# Patient Record
Sex: FEMALE | Race: WHITE
[De-identification: names, ages, dates, MRNs, and addresses within clinical notes are randomized per-mention and may not be internally consistent; named-entity substitution may affect disease eponyms.]

---

## 2019-02-21 ENCOUNTER — HOSPITAL ENCOUNTER (INPATIENT)
Dept: HOSPITAL 97 - ER | Age: 84
LOS: 3 days | Discharge: HOME | DRG: 392 | End: 2019-02-24
Attending: FAMILY MEDICINE | Admitting: FAMILY MEDICINE
Payer: COMMERCIAL

## 2019-02-21 VITALS — BODY MASS INDEX: 21.8 KG/M2

## 2019-02-21 DIAGNOSIS — K52.9: Primary | ICD-10-CM

## 2019-02-21 DIAGNOSIS — E05.90: ICD-10-CM

## 2019-02-21 DIAGNOSIS — E53.8: ICD-10-CM

## 2019-02-21 DIAGNOSIS — D50.9: ICD-10-CM

## 2019-02-21 DIAGNOSIS — Z80.0: ICD-10-CM

## 2019-02-21 DIAGNOSIS — N17.9: ICD-10-CM

## 2019-02-21 DIAGNOSIS — I10: ICD-10-CM

## 2019-02-21 DIAGNOSIS — N39.0: ICD-10-CM

## 2019-02-21 DIAGNOSIS — B96.20: ICD-10-CM

## 2019-02-21 DIAGNOSIS — E86.0: ICD-10-CM

## 2019-02-21 LAB
ALBUMIN SERPL BCP-MCNC: 3 G/DL (ref 3.4–5)
ALP SERPL-CCNC: 54 U/L (ref 45–117)
ALT SERPL W P-5'-P-CCNC: < 6 U/L (ref 12–78)
AST SERPL W P-5'-P-CCNC: 10 U/L (ref 15–37)
BUN BLD-MCNC: 54 MG/DL (ref 7–18)
FERRITIN SERPL-MCNC: 21.8 NG/ML (ref 8–388)
GLUCOSE SERPLBLD-MCNC: 139 MG/DL (ref 74–106)
HCT VFR BLD CALC: 35.1 % (ref 36–45)
IRON SERPL-MCNC: 24 UG/DL (ref 50–170)
LIPASE SERPL-CCNC: 177 U/L (ref 73–393)
LYMPHOCYTES # SPEC AUTO: 1.2 K/UL (ref 0.7–4.9)
PMV BLD: 9.3 FL (ref 7.6–11.3)
POTASSIUM SERPL-SCNC: 3.9 MMOL/L (ref 3.5–5.1)
RBC # BLD: 4.46 M/UL (ref 3.86–4.86)
TRANSFERRIN SERPL-MCNC: 263 MG/DL (ref 200–360)
TSH SERPL DL<=0.05 MIU/L-ACNC: 0.08 UIU/ML (ref 0.36–3.74)
UA COMPLETE W REFLEX CULTURE PNL UR: (no result)

## 2019-02-21 PROCEDURE — 87040 BLOOD CULTURE FOR BACTERIA: CPT

## 2019-02-21 PROCEDURE — 83540 ASSAY OF IRON: CPT

## 2019-02-21 PROCEDURE — 82728 ASSAY OF FERRITIN: CPT

## 2019-02-21 PROCEDURE — 74177 CT ABD & PELVIS W/CONTRAST: CPT

## 2019-02-21 PROCEDURE — 84466 ASSAY OF TRANSFERRIN: CPT

## 2019-02-21 PROCEDURE — 99285 EMERGENCY DEPT VISIT HI MDM: CPT

## 2019-02-21 PROCEDURE — 87088 URINE BACTERIA CULTURE: CPT

## 2019-02-21 PROCEDURE — 80048 BASIC METABOLIC PNL TOTAL CA: CPT

## 2019-02-21 PROCEDURE — 81003 URINALYSIS AUTO W/O SCOPE: CPT

## 2019-02-21 PROCEDURE — 87045 FECES CULTURE AEROBIC BACT: CPT

## 2019-02-21 PROCEDURE — 36415 COLL VENOUS BLD VENIPUNCTURE: CPT

## 2019-02-21 PROCEDURE — 84443 ASSAY THYROID STIM HORMONE: CPT

## 2019-02-21 PROCEDURE — 84145 PROCALCITONIN (PCT): CPT

## 2019-02-21 PROCEDURE — 85025 COMPLETE CBC W/AUTO DIFF WBC: CPT

## 2019-02-21 PROCEDURE — 84439 ASSAY OF FREE THYROXINE: CPT

## 2019-02-21 PROCEDURE — 80076 HEPATIC FUNCTION PANEL: CPT

## 2019-02-21 PROCEDURE — 87046 STOOL CULTR AEROBIC BACT EA: CPT

## 2019-02-21 PROCEDURE — 97163 PT EVAL HIGH COMPLEX 45 MIN: CPT

## 2019-02-21 PROCEDURE — 82607 VITAMIN B-12: CPT

## 2019-02-21 PROCEDURE — 97116 GAIT TRAINING THERAPY: CPT

## 2019-02-21 PROCEDURE — 87086 URINE CULTURE/COLONY COUNT: CPT

## 2019-02-21 PROCEDURE — 87493 C DIFF AMPLIFIED PROBE: CPT

## 2019-02-21 PROCEDURE — 81015 MICROSCOPIC EXAM OF URINE: CPT

## 2019-02-21 PROCEDURE — 83735 ASSAY OF MAGNESIUM: CPT

## 2019-02-21 PROCEDURE — 87186 SC STD MICRODIL/AGAR DIL: CPT

## 2019-02-21 PROCEDURE — 87077 CULTURE AEROBIC IDENTIFY: CPT

## 2019-02-21 PROCEDURE — 83690 ASSAY OF LIPASE: CPT

## 2019-02-21 RX ADMIN — ENOXAPARIN SODIUM SCH MG: 30 INJECTION SUBCUTANEOUS at 18:46

## 2019-02-21 RX ADMIN — SODIUM CHLORIDE SCH MLS: 0.9 INJECTION, SOLUTION INTRAVENOUS at 18:25

## 2019-02-21 RX ADMIN — Medication SCH: at 21:00

## 2019-02-21 NOTE — EDPHYS
Physician Documentation                                                                           

 [facility]                                                                

Name: Valarie Young                                                                            

Age: 89 yrs                                                                                       

Sex: Female                                                                                       

: 1929                                                                                   

MRN: [id number]                                                                                   

Arrival Date: 2019                                                                          

Time: 10:33                                                                                       

Account#: [id number]                                                                            

Bed 2                                                                                             

Private MD: Gilmar Frausto ED Physician Margarito Oro                                                                      

HPI:                                                                                              

                                                                                             

14:09 This 89 yrs old  Female presents to ER via Ambulatory with complaints of       pm1 

      Abdominal Pain, Constipation, Vomiting.                                                     

14:09 The patient presents with abdominal pain that is diffuse. Onset: The symptoms/episode   pm1 

      began/occurred 5 day(s) ago. The symptoms do not radiate. Associated signs and              

      symptoms: Pertinent positives: constipation, vomiting, Pertinent negatives: chest pain,     

      dysuria, fever, shortness of breath. The symptoms are described as achy. Modifying          

      factors: The symptoms are alleviated by nothing, the symptoms are aggravated by food.       

      Severity of pain: in the emergency department the pain is actually worse. The patient       

      has experienced similar episodes in the past, several times. The patient has not            

      recently seen a physician, the patient's primary care provider is Dr. Frausto. Patient     

      with constipation for 1 week and then started having some abdominal distention and pain     

      with vomiting when trying to drink for the past 4 days. Patient took some Colace and        

      mag citrate to resolve constipation. Had some diarrhea x 2 this AM.                         

                                                                                                  

Historical:                                                                                       

- Allergies:                                                                                      

10:49 No Known Allergies;                                                                     bp  

- Home Meds:                                                                                      

10:49 Atenolol Oral [Active]; Hydrochlorothiazide Oral [Active];                              bp  

- PMHx:                                                                                           

10:49 Hypertension;                                                                           bp  

                                                                                                  

- Immunization history:: Adult Immunizations up to date.                                          

- Social history:: Smoking status: Patient/guardian denies using tobacco.                         

- Ebola Screening: : Patient negative for fever greater than or equal to 101.5 degrees            

  Fahrenheit, and additional compatible Ebola Virus Disease symptoms Patient denies               

  exposure to infectious person Patient denies travel to an Ebola-affected area in the            

  21 days before illness onset No symptoms or risks identified at this time.                      

                                                                                                  

                                                                                                  

ROS:                                                                                              

14:09 Constitutional: Negative for fever, chills, and weight loss, Eyes: Negative for injury, pm1 

      pain, redness, and discharge, ENT: Negative for injury, pain, and discharge, Neck:          

      Negative for injury, pain, and swelling, Cardiovascular: Negative for chest pain,           

      palpitations, and edema, Respiratory: Negative for shortness of breath, cough,              

      wheezing, and pleuritic chest pain.                                                         

14:09 Back: Negative for injury and pain, : Negative for injury, bleeding, discharge, and       

      swelling, MS/Extremity: Negative for injury and deformity, Skin: Negative for injury,       

      rash, and discoloration, Neuro: Negative for headache, weakness, numbness, tingling,        

      and seizure.                                                                                

14:09 Abdomen/GI: Positive for abdominal pain, nausea and vomiting, constipation, diarrhea x      

      2 today.                                                                                    

                                                                                                  

Exam:                                                                                             

14:09 Constitutional:  This is a well developed, well nourished patient who is awake, alert,  pm1 

      and in no acute distress. Head/Face:  Normocephalic, atraumatic. Eyes:  Pupils equal        

      round and reactive to light, extra-ocular motions intact.  Lids and lashes normal.          

      Conjunctiva and sclera are non-icteric and not injected.  Cornea within normal limits.      

      Periorbital areas with no swelling, redness, or edema. ENT:  Nares patent. No nasal         

      discharge, no septal abnormalities noted.  Tympanic membranes are normal and external       

      auditory canals are clear.  Oropharynx with no redness, swelling, or masses, exudates,      

      or evidence of obstruction, uvula midline.  Mucous membranes moist. Neck:  Trachea          

      midline, no thyromegaly or masses palpated, and no cervical lymphadenopathy.  Supple,       

      full range of motion without nuchal rigidity, or vertebral point tenderness.  No            

      Meningismus. Chest/axilla:  Normal chest wall appearance and motion.  Nontender with no     

      deformity.  No lesions are appreciated. Cardiovascular:  Regular rate and rhythm with a     

      normal S1 and S2.  No gallops, murmurs, or rubs.  Normal PMI, no JVD.  No pulse             

      deficits. Respiratory:  Lungs have equal breath sounds bilaterally, clear to                

      auscultation and percussion.  No rales, rhonchi or wheezes noted.  No increased work of     

      breathing, no retractions or nasal flaring. Abdomen/GI:  Soft, non-tender, with normal      

      bowel sounds.  No distension or tympany.  No guarding or rebound.  No evidence of           

      tenderness throughout. Back:  No spinal tenderness.  No costovertebral tenderness.          

      Full range of motion. Skin:  Warm, dry with normal turgor.  Normal color with no            

      rashes, no lesions, and no evidence of cellulitis. MS/ Extremity:  Pulses equal, no         

      cyanosis.  Neurovascular intact.  Full, normal range of motion.                             

14:09 Neuro: Orientation: is normal, Motor: is normal, moves all fours.                           

                                                                                                  

Vital Signs:                                                                                      

10:49  / 53; Pulse 74; Resp 16; Temp 98.6; Pulse Ox 96% ; Weight 57.61 kg; Height 5 ft. bp  

      7 in. (170.18 cm);                                                                          

11:36  / 46; Pulse 67; Resp 14; Pulse Ox 100% ;                                         bp  

12:19  / 51; Pulse 66; Resp 12; Pulse Ox 94% ;                                          bp  

14:00  / 46; Pulse 67; Resp 14; Pulse Ox 97% ;                                          bp  

15:00  / 45; Pulse 68; Resp 16; Pulse Ox 95% ;                                          bp  

16:00  / 53; Pulse 70; Resp 14; Pulse Ox 97% ;                                          bp  

17:00  / 51; Pulse 66; Resp 16; Pulse Ox 96% ;                                          bp  

10:49 Body Mass Index 19.89 (57.61 kg, 170.18 cm)                                             bp  

                                                                                                  

MDM:                                                                                              

10:53 Patient medically screened.                                                             pm1 

14:04 Data reviewed: vital signs.                                                             pm1 

14:13 Counseling: I had a detailed discussion with the patient and/or guardian regarding: the pm1 

      historical points, exam findings, and any diagnostic results supporting the                 

      discharge/admit diagnosis, lab results, radiology results, the need for further work-up     

      and treatment in the hospital.                                                              

15:31 Physician consultation: Rico Espinosa DO was called at 15:31, was contacted at 15:31,   pm1 

      regarding admission, patient's condition, would like further tests performed,               

      Procalcitonin, in the emergency department to see patient at 15:31.                         

                                                                                                  

                                                                                             

11:01 Order name: Basic Metabolic Panel; Complete Time: 11:58                                 pm1 

                                                                                             

11:01 Order name: CBC with Diff; Complete Time: 11:55                                         pm1 

                                                                                             

11:01 Order name: Creatinine for Radiology; Complete Time: 11:55                              pm1 

                                                                                             

11:01 Order name: Hepatic Function; Complete Time: 11:58                                      pm1 

                                                                                             

11:01 Order name: Lipase; Complete Time: 11:58                                                pm1 

                                                                                             

14:46 Order name: Urine Microscopic Only; Complete Time: 16:06                                pm1 

                                                                                             

11:01 Order name: CT Abd/Pelvis - W/Contrast: PO and IV contrast; Complete Time: 13:12        pm1 

                                                                                             

14:46 Order name: Urine Culture                                                               pm                                                                                             

14:53 Order name: Urine Dipstick--Ancillary (enter results); Complete Time: 16:06             eb  

                                                                                             

15:28 Order name: Procalcitonin                                                               pm                                                                                             

11:01 Order name: IV Saline Lock; Complete Time: 11:31                                        pm1 

                                                                                             

11:01 Order name: Labs collected and sent; Complete Time: 11:31                               pm                                                                                             

12:00 Order name: Urine Dipstick-Ancillary (obtain specimen); Complete Time: 14:59            pm1 

                                                                                                  

Administered Medications:                                                                         

11:45 Drug: Zofran 4 mg Route: IVP; Site: right forearm;                                      bp  

12:18 Follow up: Response: Nausea is decreased                                                bp  

12:18 Drug: NS 0.9% 500 ml Route: IV; Rate: bolus; Site: right forearm;                       bp  

13:00 Follow up: IV Status: Completed infusion; IV Intake: 500ml                              bp  

14:20 Drug: NS 0.9% 1000 ml Route: IV; Rate: 100 ml/hr; Site: right antecubital;              bp  

14:20 Drug: Flagyl 500 mg Volume: 100 ml; Route: IVPB; Rate: 200 ml/hr; Infused Over: 30      bp  

      mins; Site: right antecubital;                                                              

16:12 Follow up: IV Status: Completed infusion; IV Intake: 100ml                              bp  

15:45 Drug: LevaQUIN 500 mg Volume: 100 ml; Route: IVPB; Infused Over: 60 mins; Site: right   bp  

      antecubital;                                                                                

                                                                                                  

                                                                                                  

Disposition:                                                                                      

19 14:14 Hospitalization ordered by Rico Espinosa for Inpatient Admission. Preliminary     

  diagnosis are Dehydration, Constipation, Unspecified abdominal pain, Right                      

  sided colitis, Vomiting.                                                                        

- Bed requested for Telemetry/MedSurg (Inpatient).                                                

- Status is Inpatient Admission.                                                              bp  

- Condition is Stable.                                                                            

- Problem is new.                                                                                 

- Symptoms have improved.                                                                         

UTI on Admission? No                                                                              

                                                                                                  

                                                                                                  

                                                                                                  

Signatures:                                                                                       

Dispatcher [employer]                           EDMS                                                 

Juanito Lainez, ANNELISE                    NP   pm1                                                  

Danelle Aquino, TAMMI                   RN   df                                                   

Mich Burk RN                      RN   bp                                                   

                                                                                                  

Corrections: (The following items were deleted from the chart)                                    

14:17 14:14 Hospitalization Ordered by Rico Espinosa DO for Inpatient Admission. Preliminary  pm1 

      diagnosis is Dehydration; Constipation; Unspecified abdominal pain. Bed requested for       

      Telemetry/MedSurg (Inpatient). Status is Inpatient Admission. Condition is Stable.          

      Problem is new. Symptoms have improved. UTI on Admission? No. pm1                           

17:09 14:17 2019 14:14 Hospitalization Ordered by Rico Espinosa DO for Inpatient        df  

      Admission. Preliminary diagnosis is Dehydration; Constipation; Unspecified abdominal        

      pain; Right sided colitis; Vomiting. Bed requested for Telemetry/MedSurg (Inpatient).       

      Status is Inpatient Admission. Condition is Stable. Problem is new. Symptoms have           

      improved. UTI on Admission? No. pm1                                                         

18:11 17:09 2019 14:14 Hospitalization Ordered by Rico Espinosa DO for Inpatient        bp  

      Admission. Preliminary diagnosis is Dehydration; Constipation; Unspecified abdominal        

      pain; Right sided colitis; Vomiting. Bed requested for Telemetry/MedSurg (Inpatient).       

      Status is Inpatient Admission. Condition is Stable. Problem is new. Symptoms have           

      improved. UTI on Admission? No. df                                                          

                                                                                                  

**************************************************************************************************

## 2019-02-21 NOTE — ER
Nurse's Notes                                                                                     

 [facility]                                                                

Name: Valarie Young                                                                            

Age: 89 yrs                                                                                       

Sex: Female                                                                                       

: 1929                                                                                   

MRN: [id number]                                                                                   

Arrival Date: 2019                                                                          

Time: 10:33                                                                                       

Account#: [id number]                                                                            

Bed 2                                                                                             

Private MD: Gilmar Frausto                                                                     

Diagnosis: Dehydration;Constipation;Unspecified abdominal pain;Right sided colitis;Vomiting       

                                                                                                  

Presentation:                                                                                     

                                                                                             

10:47 Presenting complaint: Patient states: NAUSEA AND VOMITING WITH DIFFUSE ABD PAIN AFTER   bp  

      TAKING COLACE AND MAG CITRATE TO RESOLVE CONSTIPATION. Transition of care: patient was      

      not received from another setting of care. Onset of symptoms is unknown. Risk               

      Assessment: Do you want to hurt yourself or someone else? Patient reports no desire to      

      harm self or others. Initial Sepsis Screen: Does the patient meet any 2 criteria? No.       

      Patient's initial sepsis screen is negative. Does the patient have a suspected source       

      of infection? No. Patient's initial sepsis screen is negative. Care prior to arrival:       

      None.                                                                                       

10:47 Method Of Arrival: Ambulatory                                                           bp  

10:47 Acuity: JEZ 3                                                                           bp  

                                                                                                  

Triage Assessment:                                                                                

10:49 General: Appears in no apparent distress. comfortable, Behavior is calm, cooperative,   bp  

      appropriate for age. Pain: Complains of pain in DIFFUSE ABDOMEN. EENT: No deficits          

      noted. Neuro: Level of Consciousness is awake, alert, obeys commands, Oriented to           

      person, place, time, situation, Appropriate for age. Cardiovascular: No deficits noted.     

      Respiratory: Airway is patent Respiratory effort is even, unlabored, Respiratory            

      pattern is regular, symmetrical. GI: Abdomen is non-distended, Abd is soft X 4 quads        

      Abdomen is tender to palpation X 4 quads. : No signs and/or symptoms were reported        

      regarding the genitourinary system. Derm: No deficits noted. Musculoskeletal:               

      Circulation, motion, and sensation intact. Range of motion: intact in all extremities.      

                                                                                                  

Historical:                                                                                       

- Allergies:                                                                                      

10:49 No Known Allergies;                                                                     bp  

- Home Meds:                                                                                      

10:49 Atenolol Oral [Active]; Hydrochlorothiazide Oral [Active];                              bp  

- PMHx:                                                                                           

10:49 Hypertension;                                                                           bp  

                                                                                                  

- Immunization history:: Adult Immunizations up to date.                                          

- Social history:: Smoking status: Patient/guardian denies using tobacco.                         

- Ebola Screening: : Patient negative for fever greater than or equal to 101.5 degrees            

  Fahrenheit, and additional compatible Ebola Virus Disease symptoms Patient denies               

  exposure to infectious person Patient denies travel to an Ebola-affected area in the            

  21 days before illness onset No symptoms or risks identified at this time.                      

                                                                                                  

                                                                                                  

Screening:                                                                                        

10:52 Abuse screen: Denies threats or abuse. Denies injuries from another. Nutritional        bp  

      screening: No deficits noted. Tuberculosis screening: No symptoms or risk factors           

      identified. Fall Risk None identified.                                                      

                                                                                                  

Assessment:                                                                                       

10:52 General: SEE TRIAGE NOTE.                                                               bp  

11:36 Reassessment: PO CONTRAST COMPLETED, CT NOTIFIED.                                       bp  

12:19 Reassessment: CT PENDING, VS STABLE ON MONITOR.                                         bp  

14:00 GI: Bowel sounds present X 4 quads.                                                     bp  

14:17 Reassessment: PER RAD, COLITIS VS ILEUS NOTED ON CT. ABX INFUSING.                      bp  

16:00 Reassessment: ADMIT IN PROCESS, ADMIT MD AT B/S.                                        bp  

17:15 Reassessment: ADMIT ON HOLD DUE TO PT ACUITY ON RECEIVING FLOOR.                        bp  

17:53 Reassessment: REPORT TO RILEY CADENA.                                                       bp  

                                                                                                  

Vital Signs:                                                                                      

10:49  / 53; Pulse 74; Resp 16; Temp 98.6; Pulse Ox 96% ; Weight 57.61 kg; Height 5 ft. bp  

      7 in. (170.18 cm);                                                                          

11:36  / 46; Pulse 67; Resp 14; Pulse Ox 100% ;                                         bp  

12:19  / 51; Pulse 66; Resp 12; Pulse Ox 94% ;                                          bp  

14:00  / 46; Pulse 67; Resp 14; Pulse Ox 97% ;                                          bp  

15:00  / 45; Pulse 68; Resp 16; Pulse Ox 95% ;                                          bp  

16:00  / 53; Pulse 70; Resp 14; Pulse Ox 97% ;                                          bp  

17:00  / 51; Pulse 66; Resp 16; Pulse Ox 96% ;                                          bp  

10:49 Body Mass Index 19.89 (57.61 kg, 170.18 cm)                                             bp  

                                                                                                  

ED Course:                                                                                        

10:33 Patient arrived in ED.                                                                  rg4 

10:33 Gilmar Frausto DO is Private Physician.                                             rg4 

10:43 [facility]                                                           pm1 

10:43 Margarito Oro MD is Attending Physician.                                             pm1 

10:47 Mich Burk, RN is Primary Nurse.                                                    bp  

10:48 Triage completed.                                                                       bp  

10:49 Arm band placed on.                                                                     bp  

10:52 Patient has correct armband on for positive identification. Placed in gown. Bed in low  bp  

      position. Call light in reach. Side rails up X2. Adult w/ patient.                          

11:20 Inserted saline lock: 20 gauge in right forearm, using aseptic technique. Blood         bp  

      collected.                                                                                  

12:53 CT completed. Patient tolerated procedure well. Patient moved to CT via stretcher.        

      Patient moved back from CT.                                                                 

12:57 CT Abd/Pelvis - W/Contrast: PO and IV contrast In Process Unspecified.                  EDMS

14:13 Rico Espinosa DO is Hospitalizing Provider.                                           pm1 

14:50 Urine collected: clean catch specimen, lucian colored.                                   dh3 

17:12 No provider procedures requiring assistance completed. Patient admitted, IV remains in  bp  

      place.                                                                                      

                                                                                                  

Administered Medications:                                                                         

11:45 Drug: Zofran 4 mg Route: IVP; Site: right forearm;                                      bp  

12:18 Follow up: Response: Nausea is decreased                                                bp  

12:18 Drug: NS 0.9% 500 ml Route: IV; Rate: bolus; Site: right forearm;                       bp  

13:00 Follow up: IV Status: Completed infusion; IV Intake: 500ml                              bp  

14:20 Drug: NS 0.9% 1000 ml Route: IV; Rate: 100 ml/hr; Site: right antecubital;              bp  

14:20 Drug: Flagyl 500 mg Volume: 100 ml; Route: IVPB; Rate: 200 ml/hr; Infused Over: 30      bp  

      mins; Site: right antecubital;                                                              

16:12 Follow up: IV Status: Completed infusion; IV Intake: 100ml                              bp  

15:45 Drug: LevaQUIN 500 mg Volume: 100 ml; Route: IVPB; Infused Over: 60 mins; Site: right   bp  

      antecubital;                                                                                

                                                                                                  

                                                                                                  

Intake:                                                                                           

13:00 IV: 500ml; Total: 500ml.                                                                bp  

16:12 IV: 100ml; Total: 600ml.                                                                bp  

                                                                                                  

Outcome:                                                                                          

14:14 Decision to Hospitalize by Provider.                                                    pm1 

17:54 Admitted to Med/surg accompanied by tech, family with patient, via wheelchair, room     bp  

      410, with chart, Report called to  RILEY CADENA                                                 

17:54 Condition: stable                                                                           

17:54 Instructed on the need for admit.                                                           

18:11 Patient left the ED.                                                                    bp  

                                                                                                  

Signatures:                                                                                       

Dispatcher [employer]                           EDMS                                                 

Dunia Diaz Patrick, NP                    NP   pm1                                                  

Alyssia Hernandes                                 rg4                                                  

Alicia Bernstein                              dh3                                                  

Mich Burk, RN                      RN   bp                                                   

                                                                                                  

**************************************************************************************************

## 2019-02-21 NOTE — RAD REPORT
EXAM DESCRIPTION:  CT - Abdomen   Pelvis W Contrast - 2/21/2019 12:54 pm

 

CLINICAL HISTORY:  Abdominal pain, nausea and vomiting, constipation

 

COMPARISON:  None.

 

TECHNIQUE:  Biphasic, helical CT imaging of the abdomen and pelvis was performed following 100 ml non
-ionic IV contrast. Oral contrast was given.

 

All CT scans are performed using dose optimization technique as appropriate and may include automated
 exposure control or mA/KV adjustment according to patient size.

 

FINDINGS:  Scarring and minimal nodularity in each lung base not regarded as suspicious. No pneumotho
rax or pleural effusion. No cardiomegaly or pericardial effusion.

 

Mild diffuse fatty infiltration pattern involves the liver. No focal liver lesions seen. No primary p
ancreatic or splenic process seen. Gallbladder is distended. Gallstones can be occult. No biliary carrie
e dilatation. Minimal adrenal nodularity present not regarded as significant.

 

Symmetric renal function is seen with no hydronephrosis or suspicious renal mass. No pyelonephritis o
r acute parenchymal process. Bladder is mostly contracted. Multiple phleboliths involves the pelvic f
adriano.

 

No gastric dilatation or gastric wall thickening. Oral CT contrast has reached the mid small bowel le
sharmila. No duodenal dilatation or wall thickening. There is dilation of the small bowel from ligament of
 Treitz to the terminal ileum. Liquid stool fills the cecum. There is irregular circumferential wall 
thickening of the ascending colon extending to the right side transverse colon. Left side colon is mo
stly decompressed. There is a mild diverticulosis pattern of the sigmoid colon.

 

No free air or pneumatosis. Trace amount of free fluid in the cul de sac and adjacent to the liver ca
psule.  No hernia, mass or bulky lymphadenopathy.

 

Advanced bony degenerative changes are present.

 

Dense arterial tree calcifications present.

 

 

IMPRESSION:  Nonocclusive irregular circumferential wall thickening involving the ascending colon, he
patic flexure and right side of the transverse colon.

 

Liquid stool distends the cecum and there is dilated small bowel pattern from ligament of Treitz to t
he terminal ileum.

 

Colon finding is nonspecific. Right-sided colitis would be favored. Malignancy is not excluded but do
es not typically extend over this length of colon. Small bowel dilatation is favored to be ileus from
 the colon process rather than small bowel obstruction.

 

Small quantity of free fluid in the pelvis. No free air or surgically emergent finding.

## 2019-02-21 NOTE — P.HP
Certification for Inpatient


Patient admitted to: Inpatient


With expected LOS: >2 Midnights


Patient will require the following post-hospital care: None


Practitioner: I am a practitioner with admitting privileges, knowledge of 

patient current condition, hospital course, and medical plan of care.


Services: Services provided to patient in accordance with Admission 

requirements found in Title 42 Section 412.3 of the Code of Federal Regulations





Patient History


Date of Service: 02/21/19


Primary Care Provider: Dr. Frausto


Reason for admission: Abdominal pain, nausea, vomiting and diarrhea


History of Present Illness: 





89-year-old  female presented to the emergency room with multiple 

complaints.





Patient reported nausea and vomiting since Tuesday.  She was not able to keep 

anything down.  She reported some mild distention and constipation.  Bloating 

was also noted. She took Colace and Mag citrate yesterday.  She was to come to 

the ER but started to have diarrhea.  Subjective fever noted. Patient came to 

the ER for further evaluation.





In the ER patient evaluated.  White count 11.4, hemoglobin 11.1.  Sodium 136, 

potassium 2.9, BUN of 54, creatinine 1.36 with a GFR 36. Urinalysis shows 

possible UTI.  CT scan shows suspected right-sided colitis with ileus.  No 

bowel obstruction identified. Patient was admitted for treatment.





When I saw the patient ER, she did not appear septic.  Patient appeared 

comfortable.  Patient reports no prior history of colonoscopy.  She reports 

multiple members in the family with colon cancer.  Patient with history of 

hypertension but is not taking her medication over the past week.  Patient with 

reports no melena, hematemesis or rectal bleeding.


Home medications list reviewed: Yes





- Past Medical/Surgical History


Diabetic: No


-: Hypertension


-: Hysterectomy


Psychosocial/ Personal History: Patient is a .  She has 3 children.





- Family History


  ** Father


-: Cancer (Colon cancer)





  ** Brother


-: Cancer (Colon cancer)





- Social History


Smoking Status: Never smoker


Alcohol use: No


CD- Drugs: No


Caffeine use: Yes


Place of Residence: Home





Review of Systems


General: Weakness, As per HPI


Eyes: Unremarkable


ENT: Unremarkable


Respiratory: Unremarkable


Cardiovascular: Unremarkable


Gastrointestinal: Nausea, Vomiting, Abdominal Pain, Diarrhea, As per HPI


Genitourinary: Unremarkable


Musculoskeletal: Unremarkable


Neurological: Unremarkable


Lymphatics: Unremarkable





Physical Examination





- Physical Exam


General: Alert, In no apparent distress, Oriented x3, Cooperative


HEENT: Atraumatic, Normocephalic, Other (Dry mucous membranes), EOMI


Neck: Supple, No Thyromegaly


Respiratory: Clear to auscultation bilaterally, Normal air movement


Cardiovascular: Normal pulses, Regular rate/rhythm


Gastrointestinal: Normal bowel sounds, Soft and benign, No masses, No rebound, 

No guarding, Distended (Minimal distention), Tenderness (Minimal tenderness to 

the right upper quadrant)


Musculoskeletal: No erythema, No tenderness, No warmth


Integumentary: No tenderness/swelling, No erythema, No warmth, No cyanosis


Neurological: Normal speech, Normal strength at 5/5 x4 extr, Normal tone, 

Normal affect





- Studies


Laboratory Data (last 24 hrs)





02/21/19 11:18: Creatinine 1.38 H


02/21/19 11:18: WBC 11.4 H, Hgb 11.1 L, Hct 35.1 L, Plt Count 254


02/21/19 11:18: Sodium 136, Potassium 3.9, BUN 54 H, Creatinine 1.41 H, Glucose 

139 H, Total Bilirubin 0.5, AST 10 L, ALT < 6 L, Alkaline Phosphatase 54, 

Lipase 177








Assessment and Plan





- Plan





Impression:


Abdominal pain, nausea, vomiting and diarrhea secondary to right-sided colitis 

with possible ileus complicated with possible UTI


Acute kidney injury likely due to related to dehydration


Hypertension





Plan:


Abdominal pain, nausea, vomiting and diarrhea secondary to right-sided colitis 

with possible ileus complicated with possible UTI:  Patient will be admitted.  

Will continue with IV fluids and antibiotic therapy-Cipro and Flagyl.  Blood 

and urine cultures obtained.  Will monitor closely.  Will keep the patient NPO.

  Will consult surgery to further evaluate.  Await recommendations from surgery 

prior to advancing her diet.  Will provide IV PPI.  Will continue monitor 

closely.  Monitor serial examinations.  Patient may require NG tube if with 

increased abdominal bloating, nausea and vomiting.  If with worsening pain 

patient may require surgical intervention.  Patient will need colonoscopy in 4-

6 weeks if her condition improves as there is a strong family history of colon 

cancer.  Patient reports no prior colonoscopy.


Acute kidney injury likely due to related to dehydration:  Continue with IV 

fluids.  Will monitor and adjust appropriately.  Replace electrolytes.


Hypertension:  Will provide IV medication.  Hold atenolol and 

hydrochlorothiazide.


Discharge Plan: Home


Plan to discharge in: Greater than 2 days





- Advance Directives


Does patient have a Living Will: No


Does patient have a Durable POA for Healthcare: No





- Code Status/Comfort Care


Code Status Assessed: Yes (Patient full code.)


Time Spent Managing Pts Care (In Minutes): 55

## 2019-02-22 LAB
BUN BLD-MCNC: 49 MG/DL (ref 7–18)
GLUCOSE SERPLBLD-MCNC: 97 MG/DL (ref 74–106)
HCT VFR BLD CALC: 33 % (ref 36–45)
LYMPHOCYTES # SPEC AUTO: 0.9 K/UL (ref 0.7–4.9)
MAGNESIUM SERPL-MCNC: 2.9 MG/DL (ref 1.8–2.4)
PMV BLD: 9.1 FL (ref 7.6–11.3)
POTASSIUM SERPL-SCNC: 3.4 MMOL/L (ref 3.5–5.1)
RBC # BLD: 4.2 M/UL (ref 3.86–4.86)

## 2019-02-22 RX ADMIN — SODIUM CHLORIDE SCH: 0.9 INJECTION, SOLUTION INTRAVENOUS at 04:14

## 2019-02-22 RX ADMIN — METRONIDAZOLE SCH MLS: 500 INJECTION, SOLUTION INTRAVENOUS at 02:00

## 2019-02-22 RX ADMIN — SODIUM CHLORIDE SCH MLS: 0.9 INJECTION, SOLUTION INTRAVENOUS at 02:40

## 2019-02-22 RX ADMIN — METRONIDAZOLE SCH MLS: 500 INJECTION, SOLUTION INTRAVENOUS at 17:03

## 2019-02-22 RX ADMIN — Medication SCH: at 08:32

## 2019-02-22 RX ADMIN — CARVEDILOL SCH MG: 3.12 TABLET, FILM COATED ORAL at 17:03

## 2019-02-22 RX ADMIN — ONDANSETRON PRN MG: 2 INJECTION INTRAMUSCULAR; INTRAVENOUS at 04:10

## 2019-02-22 RX ADMIN — Medication SCH ML: at 20:48

## 2019-02-22 RX ADMIN — ENOXAPARIN SODIUM SCH MG: 30 INJECTION SUBCUTANEOUS at 08:31

## 2019-02-22 RX ADMIN — SODIUM CHLORIDE SCH: 0.9 INJECTION, SOLUTION INTRAVENOUS at 13:42

## 2019-02-22 RX ADMIN — METRONIDAZOLE SCH MLS: 500 INJECTION, SOLUTION INTRAVENOUS at 08:31

## 2019-02-22 RX ADMIN — CIPROFLOXACIN SCH MLS: 2 INJECTION, SOLUTION INTRAVENOUS at 08:31

## 2019-02-22 NOTE — P.PN
Date of Service: 02/22/19





S:  Patient apparently feel somewhat better today.  Vital signs are stable.  

Having diarrhea.





O:  Patient is shower, will recheck earlier today or tomorrow.





A:  Partial obstruction appears to be relieved





P:  Continue current therapy, patient will require a colonoscopy at some point.

  Probably as an outpatient.

## 2019-02-22 NOTE — P.PN
Subjective


Date of Service: 02/22/19


Primary Care Provider: Dr. Frausto


Chief Complaint: Abdominal pain, nausea, vomiting and diarrhea


Subjective: Other (Patient improving.  Less diarrhea.  No significant nausea 

vomiting.)





Physical Examination





- Vital Signs


Temperature: 98.9 F


Blood Pressure: 140/60


Pulse: 71


Respirations: 18


Pulse Ox (%): 96





- Physical Exam


General: Alert, In no apparent distress, Oriented x3, Cooperative


HEENT: Atraumatic


Neck: Supple


Respiratory: Clear to auscultation bilaterally, Normal air movement


Cardiovascular: Normal pulses, Regular rate/rhythm


Gastrointestinal: Normal bowel sounds, Soft and benign, Non-distended, No masses

, No rebound, No guarding, Tenderness (Less pain throughout)


Musculoskeletal: No erythema, No tenderness, No warmth


Integumentary: No tenderness/swelling, No erythema, No warmth, No cyanosis


Neurological: Normal speech, Normal strength at 5/5 x4 extr, Normal tone, 

Normal affect





- Studies


Medications List Reviewed: Yes





Assessment & Plan


Discharge Plan: Home


Plan to discharge in: 48 Hours


Physician Review Additional Text: 





Impression:


Abdominal pain, nausea, vomiting and diarrhea secondary to right-sided colitis 

with possible ileus complicated with possible UTI


Acute kidney injury likely due to related to dehydration


Hypertension





Plan:


Abdominal pain, nausea, vomiting and diarrhea secondary to right-sided colitis 

with possible ileus complicated with possible UTI:  Patient continues to 

improve.  Continue IV fluids and antibiotic therapy.  Blood and urine cultures 

obtained.  Stool cultures also obtained.  Case discussed with surgery.  Will 

slowly advance diet.  Encourage ambulation with physical therapy. Patient will 

need colonoscopy in 4-6 weeks if her condition improves as there is a strong 

family history of colon cancer.  Patient reports no prior colonoscopy.


Acute kidney injury likely due to related to dehydration:  This continues to 

improve Continue with IV fluids.  Will monitor and adjust appropriately.  

Replace electrolytes.


Hypertension:  Will start low-dose carvedilol.  Discontinue atenolol/

hydrochlorothiazide


Time Spent Managing Pts Care (In Minutes): 55

## 2019-02-23 LAB
BUN BLD-MCNC: 27 MG/DL (ref 7–18)
GLUCOSE SERPLBLD-MCNC: 108 MG/DL (ref 74–106)
HCT VFR BLD CALC: 32.6 % (ref 36–45)
LYMPHOCYTES # SPEC AUTO: 1 K/UL (ref 0.7–4.9)
MAGNESIUM SERPL-MCNC: 2.5 MG/DL (ref 1.8–2.4)
PMV BLD: 9.3 FL (ref 7.6–11.3)
POTASSIUM SERPL-SCNC: 3.7 MMOL/L (ref 3.5–5.1)
RBC # BLD: 4.05 M/UL (ref 3.86–4.86)

## 2019-02-23 RX ADMIN — CIPROFLOXACIN SCH MLS: 2 INJECTION, SOLUTION INTRAVENOUS at 12:24

## 2019-02-23 RX ADMIN — METRONIDAZOLE SCH MLS: 500 INJECTION, SOLUTION INTRAVENOUS at 10:30

## 2019-02-23 RX ADMIN — ENOXAPARIN SODIUM SCH MG: 30 INJECTION SUBCUTANEOUS at 10:41

## 2019-02-23 RX ADMIN — CARVEDILOL SCH MG: 6.25 TABLET, FILM COATED ORAL at 17:01

## 2019-02-23 RX ADMIN — CYANOCOBALAMIN TAB 1000 MCG SCH MCG: 1000 TAB at 10:36

## 2019-02-23 RX ADMIN — METRONIDAZOLE SCH MLS: 500 INJECTION, SOLUTION INTRAVENOUS at 00:08

## 2019-02-23 RX ADMIN — ONDANSETRON PRN MG: 2 INJECTION INTRAMUSCULAR; INTRAVENOUS at 05:03

## 2019-02-23 RX ADMIN — LACTOBACILLUS TAB SCH TAB: TAB at 10:36

## 2019-02-23 RX ADMIN — Medication SCH ML: at 10:38

## 2019-02-23 RX ADMIN — SODIUM CHLORIDE SCH MLS: 0.9 INJECTION, SOLUTION INTRAVENOUS at 00:41

## 2019-02-23 RX ADMIN — FAMOTIDINE SCH MG: 20 TABLET, FILM COATED ORAL at 21:42

## 2019-02-23 RX ADMIN — METRONIDAZOLE SCH MLS: 500 INJECTION, SOLUTION INTRAVENOUS at 17:00

## 2019-02-23 RX ADMIN — CARVEDILOL SCH MG: 3.12 TABLET, FILM COATED ORAL at 05:03

## 2019-02-23 RX ADMIN — LACTOBACILLUS TAB SCH TAB: TAB at 21:42

## 2019-02-23 RX ADMIN — Medication SCH ML: at 21:42

## 2019-02-23 RX ADMIN — HYDRALAZINE HYDROCHLORIDE PRN MG: 20 INJECTION INTRAMUSCULAR; INTRAVENOUS at 17:40

## 2019-02-23 RX ADMIN — SODIUM CHLORIDE SCH MLS: 0.45 INJECTION, SOLUTION INTRAVENOUS at 10:33

## 2019-02-23 NOTE — P.PN
Subjective


Date of Service: 02/23/19


Primary Care Provider: Dr. Frausto


Chief Complaint: Abdominal pain, nausea, vomiting and diarrhea


Subjective: Improving (Patient tolerating her diet.  Diarrhea improved.)





Physical Examination





- Vital Signs


Temperature: 98.4 F


Blood Pressure: 169/70


Pulse: 68


Respirations: 16


Pulse Ox (%): 95





- Physical Exam


General: Alert, In no apparent distress, Oriented x3, Cooperative


HEENT: Atraumatic


Neck: Supple


Respiratory: Clear to auscultation bilaterally, Normal air movement


Cardiovascular: Normal pulses, Regular rate/rhythm


Gastrointestinal: Normal bowel sounds, Soft and benign, Non-distended, No 

tenderness, No masses, No rebound, No guarding


Musculoskeletal: No erythema, No tenderness, No warmth


Integumentary: No tenderness/swelling, No erythema, No warmth, No cyanosis


Neurological: Normal speech, Normal strength at 5/5 x4 extr, Normal tone, 

Normal affect





- Studies


Microbiology Data (last 24 hrs): 








02/21/19 14:40   Clean Catch Urine   [place] Count - Final


                            BETWEEN 10,000 & 100,000 CFU/ML


02/21/19 14:40   Clean Catch Urine    - Final


                            Escherichia Coli





Medications List Reviewed: Yes





Assessment & Plan


Discharge Plan: Home


Plan to discharge in: 24 Hours


Physician Review Additional Text: 





Impression:


Abdominal pain, nausea, vomiting and diarrhea secondary to right-sided colitis 

with possible ileus complicated with UTI, culture positive for E coli


Acute kidney injury likely due to related to dehydration


Hypertension


Anemia with iron and B12 deficiency


Possible hyperthyroidism





Plan:


Abdominal pain, nausea, vomiting and diarrhea secondary to right-sided colitis 

with possible ileus complicated with UTI, culture positive for E coli:  Patient 

continues to improve.  Continue IV antibiotic therapy and fluids.  Will advance 

diet.  Encourage ambulation with physical therapy.  Encourage incentive 

spirometer.  Anticipate discharge in the next 24 hr.  No surgical intervention 

required at this time.  Patient will need colonoscopy in 4-6 weeks to further 

evaluate especially with her strong family history of colon cancer.  Patient 

with no prior colonoscopy.


Acute kidney injury likely due to related to dehydration:  Improved.  Continue 

with IV fluids.  Will monitor and adjust electrolytes.


Hypertension:  Continue to monitor and adjust carvedilol.  Discontinue atenolol/

hydrochlorothiazide


Anemia with iron and B12 deficiency:  Will start iron and B12 supplementation.  

Will monitor closely.


Possible hyperthyroidism:  Will recommend for lab-tsh and free T4 to be 

rechecked as an outpatient.  If still abnormal patient will require 

endocrinology evaluation.





Time Spent Managing Pts Care (In Minutes): 55

## 2019-02-23 NOTE — P.PN
Date of Service: 02/23/19





S:  Patient continues to improve.  Still having some diarrhea.  Denies any 

abdominal pain at this time.  Tolerating the diet she is on.





O:  Abdomen remains soft, vital signs are stable, white cell count has resolve





A:  She is much improved





P:  Patient appears to be improving.  Anticipate patient discharge most likely 

with home health at some point.  She needs a colonoscopy in 4-6 weeks after 

this inflammatory process has resolved.

## 2019-02-24 VITALS — DIASTOLIC BLOOD PRESSURE: 59 MMHG | TEMPERATURE: 96.9 F | SYSTOLIC BLOOD PRESSURE: 122 MMHG

## 2019-02-24 VITALS — OXYGEN SATURATION: 97 %

## 2019-02-24 LAB
BUN BLD-MCNC: 13 MG/DL (ref 7–18)
GLUCOSE SERPLBLD-MCNC: 101 MG/DL (ref 74–106)
HCT VFR BLD CALC: 34.1 % (ref 36–45)
LYMPHOCYTES # SPEC AUTO: 1.6 K/UL (ref 0.7–4.9)
MAGNESIUM SERPL-MCNC: 2.3 MG/DL (ref 1.8–2.4)
PMV BLD: 9.2 FL (ref 7.6–11.3)
POTASSIUM SERPL-SCNC: 3.8 MMOL/L (ref 3.5–5.1)
RBC # BLD: 4.29 M/UL (ref 3.86–4.86)

## 2019-02-24 RX ADMIN — METRONIDAZOLE SCH MLS: 500 INJECTION, SOLUTION INTRAVENOUS at 00:27

## 2019-02-24 RX ADMIN — SODIUM CHLORIDE SCH MLS: 0.45 INJECTION, SOLUTION INTRAVENOUS at 00:29

## 2019-02-24 RX ADMIN — ENOXAPARIN SODIUM SCH MG: 30 INJECTION SUBCUTANEOUS at 09:44

## 2019-02-24 RX ADMIN — HYDRALAZINE HYDROCHLORIDE PRN MG: 20 INJECTION INTRAMUSCULAR; INTRAVENOUS at 05:23

## 2019-02-24 RX ADMIN — Medication SCH ML: at 09:44

## 2019-02-24 RX ADMIN — CIPROFLOXACIN SCH MLS: 2 INJECTION, SOLUTION INTRAVENOUS at 10:56

## 2019-02-24 RX ADMIN — LACTOBACILLUS TAB SCH TAB: TAB at 09:43

## 2019-02-24 RX ADMIN — CARVEDILOL SCH MG: 6.25 TABLET, FILM COATED ORAL at 06:47

## 2019-02-24 RX ADMIN — METRONIDAZOLE SCH MLS: 500 INJECTION, SOLUTION INTRAVENOUS at 09:44

## 2019-02-24 RX ADMIN — CYANOCOBALAMIN TAB 1000 MCG SCH MCG: 1000 TAB at 09:43

## 2019-02-24 RX ADMIN — FAMOTIDINE SCH MG: 20 TABLET, FILM COATED ORAL at 09:43

## 2019-02-24 NOTE — P.DS
Admission Date: 02/21/19


Discharge Date: 02/24/19


Primary Care Provider: Dr. Frausto


Disposition: ROUTINE DISCHARGE


Discharge Condition: GOOD


Reason for Admission: Abdominal pain, nausea, vomiting and diarrhea


Consultations: 





Surgery-Dr. Lewis





Procedures: 





CT scan: 


COMPARISON:  None. 


TECHNIQUE:  Biphasic, helical CT imaging of the abdomen and pelvis was 

performed following 100 ml non-ionic IV contrast. Oral contrast was given. 


All CT scans are performed using dose optimization technique as appropriate and 

may include automated exposure control or mA/KV adjustment according to patient 

size. 


FINDINGS:  Scarring and minimal nodularity in each lung base not regarded as 

suspicious. No pneumothorax or pleural effusion. No cardiomegaly or pericardial 

effusion. 


Mild diffuse fatty infiltration pattern involves the liver. No focal liver 

lesions seen. No primary pancreatic or splenic process seen. Gallbladder is 

distended. Gallstones can be occult. No biliary tree dilatation. Minimal 

adrenal nodularity present not regarded as significant. 


Symmetric renal function is seen with no hydronephrosis or suspicious renal 

mass. No pyelonephritis or acute parenchymal process. Bladder is mostly 

contracted. Multiple phleboliths involves the pelvic floor. 


No gastric dilatation or gastric wall thickening. Oral CT contrast has reached 

the mid small bowel level. No duodenal dilatation or wall thickening. There is 

dilation of the small bowel from ligament of Treitz to the terminal ileum. 

Liquid stool fills the cecum. There is irregular circumferential wall 

thickening of the ascending colon extending to the right side transverse colon. 

Left side colon is mostly decompressed. There is a mild diverticulosis pattern 

of the sigmoid colon. 


No free air or pneumatosis. Trace amount of free fluid in the cul de sac and 

adjacent to the liver capsule.  No hernia, mass or bulky lymphadenopathy. 


Advanced bony degenerative changes are present. 


Dense arterial tree calcifications present. 


IMPRESSION:  Nonocclusive irregular circumferential wall thickening involving 

the ascending colon, hepatic flexure and right side of the transverse colon. 


Liquid stool distends the cecum and there is dilated small bowel pattern from 

ligament of Treitz to the terminal ileum. 


Colon finding is nonspecific. Right-sided colitis would be favored. Malignancy 

is not excluded but does not typically extend over this length of colon. Small 

bowel dilatation is favored to be ileus from the colon process rather than 

small bowel obstruction. 


Small quantity of free fluid in the pelvis. No free air or surgically emergent 

finding.





Medical problem list: 


Abdominal pain, nausea, vomiting and diarrhea secondary to right-sided colitis 

complicated with UTI, culture positive for E coli


Acute kidney injury likely due to related to dehydration


Hypertension


Anemia with iron and B12 deficiency


Possible hyperthyroidism





Brief History of Present Illness: 





89-year-old  female presented to the emergency room with multiple 

complaints.





Patient reported nausea and vomiting since Tuesday.  She was not able to keep 

anything down.  She reported some mild distention and constipation.  Bloating 

was also noted. She took Colace and Mag citrate yesterday.  She was to come to 

the ER but started to have diarrhea.  Subjective fever noted. Patient came to 

the ER for further evaluation.





In the ER patient evaluated.  White count 11.4, hemoglobin 11.1.  Sodium 136, 

potassium 2.9, BUN of 54, creatinine 1.36 with a GFR 36. Urinalysis shows 

possible UTI.  CT scan shows suspected right-sided colitis with ileus.  No 

bowel obstruction identified. Patient was admitted for treatment.





When I saw the patient ER, she did not appear septic.  Patient appeared 

comfortable.  Patient reports no prior history of colonoscopy.  She reports 

multiple members in the family with colon cancer.  Patient with history of 

hypertension but is not taking her medication over the past week.  Patient with 

reports no melena, hematemesis or rectal bleeding.


Hospital Course: 





Patient presented with abdominal pain, nausea, vomiting and diarrhea secondary 

to right-sided colitis and UTI.  CT scan reviewed.  No obstruction was 

identified. Patient was admitted for treatment.  Patient received IV fluids and 

antibiotic therapy.  Surgery was also consulted.  No surgical intervention was 

required.  C diff culture negative.  Urine culture positive for E coli.  The 

patient continued to do well.  At discharge she was able tolerate her diet.  No 

significant abdominal pain noted at discharge. At discharge will continue with 

Cipro 500 mg 1 pill twice daily and Flagyl 500 mg 3 times a day for 7 days.  

She is to continue with a soft GI diet.  Recommend for the patient to establish 

care with GI for colonoscopy in 4-6 weeks to further evaluate.  Patient with 

strong family history of colon cancer.  Colon cancer will need to be ruled out.





Patient also had acute renal injury likely related to her colitis and UTI.  

This improved with IV fluids.  Continue with good oral hydration at home.





Patient with hypertension.  Atenolol and hydrochlorothiazide discontinued.  New 

medication includes carvedilol.  At discharge she will continue with carvedilol 

6.25 mg 1 pill twice daily.  Recommend to maintain blood pressures less 150/80.

  Further adjustment can be done by her PCP.





Patient also found to be anemic.  Iron and B12 deficiency identified. At 

discharge she will continue with multi vitamin with iron along with B12 

supplementation.  Patient will need a follow up with GI as an outpatient for 

colonoscopy and possible EGD evaluation to further address.  Recommend to 

recheck lab-CBC in 2-4 weeks to monitor her progress.





Patient found to have abnormal tsh and free T4.  Possible hyperthyroidism 

identified. Recommend to recheck lab-tsh and free T4 in 2-4 weeks.  If still 

abnormal patient will require endocrinology evaluation to further assess and 

possibly treat. 





Vital Signs/Physical Exam: 














Temp Pulse Resp BP Pulse Ox


 


 96.9 F   77   18   122/59 L  95 


 


 02/24/19 08:00  02/24/19 08:00  02/24/19 08:00  02/24/19 08:00  02/24/19 08:00








General: Alert, In no apparent distress, Oriented x3, Cooperative


HEENT: Atraumatic


Neck: Supple


Respiratory: Clear to auscultation bilaterally, Normal air movement


Cardiovascular: Normal pulses, Regular rate/rhythm


Gastrointestinal: Normal bowel sounds, Soft and benign, Non-distended, No 

ascites, No tenderness, No masses, No rebound, No guarding


Musculoskeletal: No contractures, No erythema, No tenderness, No warmth


Integumentary: No tenderness/swelling, No erythema, No warmth, No cyanosis


Neurological: Normal speech, Normal strength at 5/5 x4 extr, Normal tone, 

Normal affect


Laboratory Data at Discharge: 














WBC  6.7 K/uL (4.3-10.9)  D 02/24/19  04:54    


 


Hgb  10.8 g/dL (12.0-15.0)  L  02/24/19  04:54    


 


Hct  34.1 % (36.0-45.0)  L  02/24/19  04:54    


 


Plt Count  255 K/uL (152-406)  D 02/24/19  04:54    


 


Sodium  141 mmol/L (136-145)   02/24/19  04:54    


 


Potassium  3.8 mmol/L (3.5-5.1)   02/24/19  04:54    


 


BUN  13 mg/dL (7-18)   02/24/19  04:54    


 


Creatinine  0.65 mg/dL (0.55-1.3)   02/24/19  04:54    


 


Glucose  101 mg/dL ()   02/24/19  04:54    


 


Magnesium  2.3 mg/dL (1.8-2.4)   02/24/19  04:54    


 


Total Bilirubin  0.5 mg/dL (0.2-1.0)   02/21/19  11:18    


 


AST  10 U/L (15-37)  L  02/21/19  11:18    


 


ALT  < 6 U/L (12-78)  L  02/21/19  11:18    


 


Alkaline Phosphatase  54 U/L ()   02/21/19  11:18    


 


Lipase  177 U/L ()   02/21/19  11:18    








Home Medications: 








Cholecalciferol (Vitamin D3) [Vitamin D 5,000 IU Cap*] 1 cap PO DAILY 02/22/19 


Carvedilol [Coreg*] 6.25 mg PO BID 6AM 6PM #60 tab 02/24/19 


Ciprofloxacin HCl [Cipro 500 MG Tablet] 500 mg PO BID #14 tab 02/24/19 


Cyanocobalamin [Vitamin B-12*] 1,000 mcg PO DAILY #30 tab 02/24/19 


Iron/FA/Vit B-Com W/C [Hemocyte Plus*] 1 tab PO DAILY WITH BREAKFAST #30 tab 02/ 24/19 


Lactobacillus Acidophilus [Probiotic Acidophilus] 1 each PO BID #60 tablet 02/24 /19 


metroNIDAZOLE [Flagyl] 500 mg PO Q8H #21 tablet 02/24/19 





New Medications: 


Carvedilol [Coreg*] 6.25 mg PO BID 6AM 6PM #60 tab


Ciprofloxacin HCl [Cipro 500 MG Tablet] 500 mg PO BID #14 tab


Cyanocobalamin [Vitamin B-12*] 1,000 mcg PO DAILY #30 tab


Iron/FA/Vit B-Com W/C [Hemocyte Plus*] 1 tab PO DAILY WITH BREAKFAST #30 tab


Lactobacillus Acidophilus [Probiotic Acidophilus] 1 each PO BID #60 tablet


metroNIDAZOLE [Flagyl] 500 mg PO Q8H #21 tablet


Patient Discharge Instructions: 1.  Patient will follow up with her PCP in 1 

week to follow up this hospitalization.  2.  The patient presented with 

abdominal pain, nausea, vomiting and diarrhea secondary to right-sided colitis 

and UTI.  CT scan reviewed.  No obstruction was identified. Patient was 

admitted for treatment.  Patient received IV fluids and antibiotic therapy.  

Surgery was also consulted.  No surgical intervention was required.  C diff 

culture negative.  Urine culture positive for E coli.  The patient continued to 

do well.  At discharge she was able tolerate her diet.  No significant 

abdominal pain noted at discharge. At discharge will continue with Cipro 500 mg 

1 pill twice daily and Flagyl 500 mg 3 times a day for 7 days.  She is to 

continue with a soft GI diet.  Recommend for the patient to establish care with 

GI for colonoscopy in 4-6 weeks to further evaluate.  Patient with strong 

family history of colon cancer.  Colon cancer will need to be ruled out.  3.  

Patient also had acute renal injury likely related to her colitis and UTI.  

This improved with IV fluids.  Continue with good oral hydration at home.  4.  

Patient with hypertension.  Atenolol and hydrochlorothiazide discontinued.  New 

medication includes carvedilol.  At discharge she will continue with carvedilol 

6.25 mg 1 pill twice daily.  Recommend to maintain blood pressures less 150/80.

  Further adjustment can be done by her PCP.  5.  Patient also found to be 

anemic.  Iron and B12 deficiency identified. At discharge she will continue 

with multi vitamin with iron along with B12 supplementation.  Patient will need 

a follow up with GI as an outpatient for colonoscopy and possible EGD 

evaluation to further address.  Recommend to recheck lab-CBC in 2-4 weeks to 

monitor her progress.  6.  Patient found to have abnormal tsh and free T4.  

Possible hyperthyroidism identified. Recommend to recheck lab-tsh and free T4 

in 2-4 weeks.  If still abnormal patient will require endocrinology evaluation 

to further assess and possibly treat.


Diet: GI soft


Activity: Ad elaine


Time spent managing pt's care (in minutes): 55